# Patient Record
Sex: FEMALE | Race: OTHER
[De-identification: names, ages, dates, MRNs, and addresses within clinical notes are randomized per-mention and may not be internally consistent; named-entity substitution may affect disease eponyms.]

---

## 2018-01-15 ENCOUNTER — HOSPITAL ENCOUNTER (INPATIENT)
Dept: HOSPITAL 54 - TELE | Age: 78
LOS: 3 days | Discharge: HOME | DRG: 140 | End: 2018-01-18
Attending: INTERNAL MEDICINE | Admitting: INTERNAL MEDICINE
Payer: COMMERCIAL

## 2018-01-15 VITALS — SYSTOLIC BLOOD PRESSURE: 130 MMHG | DIASTOLIC BLOOD PRESSURE: 68 MMHG

## 2018-01-15 VITALS — BODY MASS INDEX: 34.42 KG/M2 | WEIGHT: 153 LBS | HEIGHT: 56 IN

## 2018-01-15 VITALS — SYSTOLIC BLOOD PRESSURE: 133 MMHG | DIASTOLIC BLOOD PRESSURE: 72 MMHG

## 2018-01-15 DIAGNOSIS — E66.9: ICD-10-CM

## 2018-01-15 DIAGNOSIS — E86.1: ICD-10-CM

## 2018-01-15 DIAGNOSIS — J15.6: ICD-10-CM

## 2018-01-15 DIAGNOSIS — E87.1: ICD-10-CM

## 2018-01-15 DIAGNOSIS — J44.0: Primary | ICD-10-CM

## 2018-01-15 DIAGNOSIS — N17.0: ICD-10-CM

## 2018-01-15 DIAGNOSIS — Z79.899: ICD-10-CM

## 2018-01-15 DIAGNOSIS — J15.9: ICD-10-CM

## 2018-01-15 DIAGNOSIS — J44.1: ICD-10-CM

## 2018-01-15 DIAGNOSIS — Z79.84: ICD-10-CM

## 2018-01-15 DIAGNOSIS — I10: ICD-10-CM

## 2018-01-15 DIAGNOSIS — E78.5: ICD-10-CM

## 2018-01-15 DIAGNOSIS — E11.65: ICD-10-CM

## 2018-01-15 DIAGNOSIS — J45.909: ICD-10-CM

## 2018-01-15 PROCEDURE — Z7610: HCPCS

## 2018-01-15 PROCEDURE — C9113 INJ PANTOPRAZOLE SODIUM, VIA: HCPCS

## 2018-01-15 NOTE — NUR
RN ADMITTING NOTE

PATIENT BROUGHT INTO THE UNIT VIA GURNEY, ACCOMPANIED BY 3 PERSONNEL OF [facility] 
TRANSPORTATION. PATIENT IS Bengali SPEAKING, ALERT AND ORIENTED X 3, VERBALLY RESPONSIVE AND 
IS ABLE TO MAKE NEEDS KNOWN. NOTED PATIENT RECEIVING O2 VIA NC @2LPM, O2 SAT @ 98%, VITAL 
SIGNS WNL, NOTED WITH SOB UPON EXERTION, IN NO ACUTE DISTRESS ADN WITH NO C/O PAIN AT THIS 
TIME. REPORT RECEIVED THAT PATIENT CAME FROM [facility], AS PER [facility] PERSONNEL, 
PATIENT'S DAUGHTER, CARLOS WILL COME TO THE HOSPITAL IN THE MORNING, CARLOS'S PHONE 
NUMBER IS [phone]. ORIENTED PATIENT TO UNIT, ROOM, CALL LIGHT AND USE OF CALL LIGHT, 
VERBALIZED UNDERSTANDING. ALL PATIENT'S NEEDS ATTENDED TO AT THIS TIME, PT AWARE OF SAFETY 
NEEDS. BED PLACED IN LOW POSITION AND LOCKED IN PLACE.  CALL LIGHT PLACED WITHIN EASY REACH. 
DR. BAM MONTES, ON CALL MD,PAGED FOR ADMISSION ORDERS, AWAITING FOR CALL BACK.

## 2018-01-16 VITALS — SYSTOLIC BLOOD PRESSURE: 123 MMHG | DIASTOLIC BLOOD PRESSURE: 63 MMHG

## 2018-01-16 VITALS — DIASTOLIC BLOOD PRESSURE: 78 MMHG | SYSTOLIC BLOOD PRESSURE: 126 MMHG

## 2018-01-16 VITALS — SYSTOLIC BLOOD PRESSURE: 133 MMHG | DIASTOLIC BLOOD PRESSURE: 78 MMHG

## 2018-01-16 VITALS — SYSTOLIC BLOOD PRESSURE: 129 MMHG | DIASTOLIC BLOOD PRESSURE: 67 MMHG

## 2018-01-16 LAB
BASE EXCESS BLDA CALC-SCNC: 1.5 MMOL/L
DO-HGB MFR BLDA: 66.1 MMHG
INHALED O2 CONCENTRATION: 28 %
IRON SERPL-MCNC: 20 UG/DL (ref 50–175)
PCO2 TEMP ADJ BLDA: 37.4 MMHG (ref 35–45)
PH TEMP ADJ BLDA: 7.45 [PH] (ref 7.35–7.45)
PO2 TEMP ADJ BLDA: 89.4 MMHG (ref 75–100)
SAO2 % BLDA: 96.4 % (ref 92–98.5)
TIBC SERPL-MCNC: 229 UG/DL (ref 250–450)
VENTILATION MODE VENT: (no result)

## 2018-01-16 RX ADMIN — DEXTROSE MONOHYDRATE SCH MLS/HR: 50 INJECTION, SOLUTION INTRAVENOUS at 12:01

## 2018-01-16 RX ADMIN — ALBUTEROL SULFATE SCH MG: 2.5 SOLUTION RESPIRATORY (INHALATION) at 09:14

## 2018-01-16 RX ADMIN — AMLODIPINE BESYLATE SCH MG: 2.5 TABLET ORAL at 02:24

## 2018-01-16 RX ADMIN — PIPERACILLIN SODIUM AND TAZOBACTAM SODIUM SCH MLS/HR: .375; 3 INJECTION, POWDER, LYOPHILIZED, FOR SOLUTION INTRAVENOUS at 10:20

## 2018-01-16 RX ADMIN — INSULIN HUMAN PRN UNIT: 100 INJECTION, SOLUTION PARENTERAL at 17:09

## 2018-01-16 RX ADMIN — SODIUM CHLORIDE SCH MG: 9 INJECTION, SOLUTION INTRAVENOUS at 09:27

## 2018-01-16 RX ADMIN — Medication SCH EACH: at 12:02

## 2018-01-16 RX ADMIN — PIPERACILLIN SODIUM AND TAZOBACTAM SODIUM SCH MLS/HR: .375; 3 INJECTION, POWDER, LYOPHILIZED, FOR SOLUTION INTRAVENOUS at 18:49

## 2018-01-16 RX ADMIN — LOSARTAN POTASSIUM SCH MG: 50 TABLET, FILM COATED ORAL at 09:29

## 2018-01-16 RX ADMIN — SODIUM CHLORIDE PRN MLS/HR: 9 INJECTION, SOLUTION INTRAVENOUS at 21:21

## 2018-01-16 RX ADMIN — PIPERACILLIN SODIUM AND TAZOBACTAM SODIUM SCH MLS/HR: .375; 3 INJECTION, POWDER, LYOPHILIZED, FOR SOLUTION INTRAVENOUS at 13:28

## 2018-01-16 RX ADMIN — INSULIN HUMAN PRN UNIT: 100 INJECTION, SOLUTION PARENTERAL at 12:03

## 2018-01-16 RX ADMIN — INSULIN DETEMIR SCH UNIT: 100 INJECTION, SOLUTION SUBCUTANEOUS at 21:28

## 2018-01-16 RX ADMIN — Medication SCH EACH: at 17:14

## 2018-01-16 RX ADMIN — AMLODIPINE BESYLATE SCH MG: 2.5 TABLET ORAL at 09:29

## 2018-01-16 RX ADMIN — ALBUTEROL SULFATE SCH MG: 2.5 SOLUTION RESPIRATORY (INHALATION) at 19:25

## 2018-01-16 RX ADMIN — ALBUTEROL SULFATE SCH MG: 2.5 SOLUTION RESPIRATORY (INHALATION) at 14:19

## 2018-01-16 RX ADMIN — Medication SCH EACH: at 21:21

## 2018-01-16 RX ADMIN — PIPERACILLIN SODIUM AND TAZOBACTAM SODIUM SCH MLS/HR: .375; 3 INJECTION, POWDER, LYOPHILIZED, FOR SOLUTION INTRAVENOUS at 23:48

## 2018-01-16 RX ADMIN — ALBUTEROL SULFATE SCH MG: 2.5 SOLUTION RESPIRATORY (INHALATION) at 01:54

## 2018-01-16 RX ADMIN — HUMAN INSULIN PRN UNIT: 100 INJECTION, SOLUTION SUBCUTANEOUS at 21:26

## 2018-01-16 NOTE — NUR
RN NOTE

PATIENT'S BLOOD SUGAR = 533MG/DL, STAT RANDOM BLOOD GLUCOSE ORDER PLACED. PATIENT ALERT AND 
ORIENTED X 3 AND IS IN NO ACUTE DISTRESS. ON CALL MD PAGED, AWAITING FOR CALL BACK.

## 2018-01-16 NOTE — NUR
RN OPENING NOTES

PT RESTING IN BED. FAMILY AT BEDSIDE. NO COMPLAINTS OF PAIN OR DISTRESS AT THIS TIME. PT HAS 
A LEFT HAND #22 IV, INTACT AND PATENT. SAFETY PRECAUTIONS IN PLACE, BED IN LOW LOCKED 
POSITION, [id number] UP CALL LIGHT WITHIN REACH. WILL CONTINUE TO MONITOR.

## 2018-01-16 NOTE — NUR
Spoke with daughter Naial, patient lives with daughter Naila and Emilie in the second floor 
apartment. Patient speaks Welsh only; she ambulate with a cane, semi-independent with 
adl's. Has no homehealth reported. Family are involved and supportive, daughter will provide 
ride home once discharge. 

-------------------------------------------------------------------------------

Addendum: 01/16/18 at 1503 by SORAYA DA SILVA RN

-------------------------------------------------------------------------------

Amended: Links added.

## 2018-01-16 NOTE — NUR
RN CLOSING NOTES

PATIENT IN BED, ASLEEP BUT CAN BE EASILY AWOKEN, ALERT AND ORIENTED X 4, VERBALLY 
RESPONSIVE, NO C/O PAIN AT THIS TIME, IN NO ACUTE DISTRESS. CONTINUES TO RECEIVE O2 VIA NC @ 
2LPM, O2 SAT @ 95% ALL PATIENT'S NEEDS ATTENDED TO. DVT PUMPS IN PLACE, CALL LIGHT PLACED 
WITHIN EASY REACH. WILL ENDORSE TO AM SHIFT NURSE FOR CONTINUITY OF CARE.

## 2018-01-16 NOTE — NUR
MS RN NOTES 



PATIENTS BLOOD SUGAR NOTED 447MG/DL INSULIN ADMINISTERED AS PER SLIDING SCALE. ART DIEHL 
NOTIFIED OF GLUCOSE LEVEL STATES WILL COME TO EVALUATE PATIENT.

## 2018-01-16 NOTE — NUR
MS RN NOTES 



ART GALLEGOS NP REVIEWED PATIENTS LABS ORDERED LEVAMIR 10 UNITS QHS. ORDERS NOTED AND CARRIED 
OUT.

## 2018-01-16 NOTE — NUR
MS RN NOTES 



PATIENT IN BED RESTING NO SOB OR ACUTE DISTRESS NOTED. PATIENT ALERT, ORIENTED X4 American 
SPEAKING. PERIPHERAL IV INTACT PATENT. BED IN LOW LOCKED POSITION. CALL LIGHT WITHIN REACH. 
PATIENT NOTED WITH COUGH. WITH NORMAL NONE LABORED BREATHING. ON 2L OXYGEN VIA NASAL 
CANNULA.  WILL CONTINUE TO MONITOR.

## 2018-01-16 NOTE — NUR
TELE RN NOTES 



PATIENT NOTED WITH BG OF 483MG/DL PATIENT ASYMPTOMATIC ART DIEHL NOTIFIED ORDERS TO CHANGE 
SLIDING SCALE FROM MILD TO MOD. PATIENT ALSO SEEN AND EVALUATED BY ART DIEHL ORDERS NOTED 
AND CARRIED OUT.

## 2018-01-16 NOTE — NUR
RN NOTE

PATIENT IN BED, ASLEEP BUT EASILY AWOKEN, ALERT AND ORIENTED X 4, IN NO ACUTE DISTRESS, 
CONTINUES TO RECEIVE  O2 VIA NC @ 2LPM. CALL LIGHT PLACED WITHIN EASY REACH. WILL CONTINUE 
TO MONITOR PATIENT.

## 2018-01-16 NOTE — NUR
MS RN NOTES 



PATIENT IN BED ALERT, ORIENTED X4 FAMILY AT BEDSIDE. NO SOB OR ACUTE DISTRESS NOTED. ALL DUE 
MEDICATIONS ADMINISTERED. ALL NEEDS MET. PERIPHERAL IV ON LEFT HAND INTACT PATENT. BED IN 
LOW LOCKED POSITION. CALL LIGHT WITHIN REACH. WILL ENDORSE JUSTIN TO PM SHIFT.

## 2018-01-17 VITALS — DIASTOLIC BLOOD PRESSURE: 81 MMHG | SYSTOLIC BLOOD PRESSURE: 138 MMHG

## 2018-01-17 VITALS — SYSTOLIC BLOOD PRESSURE: 115 MMHG | DIASTOLIC BLOOD PRESSURE: 81 MMHG

## 2018-01-17 VITALS — DIASTOLIC BLOOD PRESSURE: 81 MMHG | SYSTOLIC BLOOD PRESSURE: 145 MMHG

## 2018-01-17 LAB
ALBUMIN SERPL BCP-MCNC: 2.3 G/DL (ref 3.4–5)
ALP SERPL-CCNC: 62 U/L (ref 46–116)
ALT SERPL W P-5'-P-CCNC: 29 U/L (ref 12–78)
APTT PPP: 23 SEC (ref 23–34)
AST SERPL W P-5'-P-CCNC: 17 U/L (ref 15–37)
BASOPHILS # BLD AUTO: 0 /CMM (ref 0–0.2)
BASOPHILS NFR BLD AUTO: 0 % (ref 0–2)
BILIRUB SERPL-MCNC: 0.3 MG/DL (ref 0.2–1)
BUN SERPL-MCNC: 24 MG/DL (ref 7–18)
CALCIUM SERPL-MCNC: 8.6 MG/DL (ref 8.5–10.1)
CHLORIDE SERPL-SCNC: 105 MMOL/L (ref 98–107)
CK MB SERPL-MCNC: 0.6 NG/ML (ref 0–3.6)
CO2 SERPL-SCNC: 25 MMOL/L (ref 21–32)
CREAT SERPL-MCNC: 0.8 MG/DL (ref 0.6–1.3)
EOSINOPHIL # BLD AUTO: 0 /CMM (ref 0–0.7)
EOSINOPHIL NFR BLD AUTO: 0 % (ref 0–6)
FIBRINOGEN PPP-MCNC: 522 MG/DL (ref 213–485)
GLUCOSE SERPL-MCNC: 305 MG/DL (ref 74–106)
HCT VFR BLD AUTO: 31 % (ref 33–45)
HGB BLD-MCNC: 10.8 G/DL (ref 11.5–14.8)
INR PPP: 1 (ref 0.87–1.13)
LYMPHOCYTES NFR BLD AUTO: 0.8 /CMM (ref 0.8–4.8)
LYMPHOCYTES NFR BLD AUTO: 8.2 % (ref 20–44)
MCH RBC QN AUTO: 30 PG (ref 26–33)
MCHC RBC AUTO-ENTMCNC: 35 G/DL (ref 31–36)
MCV RBC AUTO: 87 FL (ref 82–100)
MONOCYTES NFR BLD AUTO: 0.3 /CMM (ref 0.1–1.3)
MONOCYTES NFR BLD AUTO: 3.5 % (ref 2–12)
NEUTROPHILS # BLD AUTO: 8.4 /CMM (ref 1.8–8.9)
NEUTROPHILS NFR BLD AUTO: 88.3 % (ref 43–81)
PLATELET # BLD AUTO: 167 /CMM (ref 150–450)
POTASSIUM SERPL-SCNC: 3.7 MMOL/L (ref 3.5–5.1)
PROT SERPL-MCNC: 7 G/DL (ref 6.4–8.2)
RBC # BLD AUTO: 3.61 MIL/UL (ref 4–5.2)
RDW COEFFICIENT OF VARIATION: 16.5 (ref 11.5–15)
SODIUM SERPL-SCNC: 141 MMOL/L (ref 136–145)
TROPONIN I SERPL-MCNC: < 0.017 NG/ML (ref 0–0.06)
WBC NRBC COR # BLD AUTO: 9.5 K/UL (ref 4.3–11)

## 2018-01-17 RX ADMIN — INSULIN DETEMIR SCH UNIT: 100 INJECTION, SOLUTION SUBCUTANEOUS at 22:02

## 2018-01-17 RX ADMIN — ALBUTEROL SULFATE SCH MG: 2.5 SOLUTION RESPIRATORY (INHALATION) at 02:20

## 2018-01-17 RX ADMIN — INSULIN HUMAN PRN UNIT: 100 INJECTION, SOLUTION PARENTERAL at 17:10

## 2018-01-17 RX ADMIN — LOSARTAN POTASSIUM SCH MG: 50 TABLET, FILM COATED ORAL at 08:24

## 2018-01-17 RX ADMIN — PIPERACILLIN SODIUM AND TAZOBACTAM SODIUM SCH MLS/HR: .375; 3 INJECTION, POWDER, LYOPHILIZED, FOR SOLUTION INTRAVENOUS at 17:51

## 2018-01-17 RX ADMIN — SODIUM CHLORIDE SCH MG: 9 INJECTION, SOLUTION INTRAVENOUS at 08:23

## 2018-01-17 RX ADMIN — HUMAN INSULIN PRN UNIT: 100 INJECTION, SOLUTION SUBCUTANEOUS at 12:11

## 2018-01-17 RX ADMIN — DEXTROSE MONOHYDRATE SCH MLS/HR: 50 INJECTION, SOLUTION INTRAVENOUS at 00:57

## 2018-01-17 RX ADMIN — Medication SCH EACH: at 17:11

## 2018-01-17 RX ADMIN — PIPERACILLIN SODIUM AND TAZOBACTAM SODIUM SCH MLS/HR: .375; 3 INJECTION, POWDER, LYOPHILIZED, FOR SOLUTION INTRAVENOUS at 12:19

## 2018-01-17 RX ADMIN — Medication SCH EACH: at 21:57

## 2018-01-17 RX ADMIN — ALBUTEROL SULFATE SCH MG: 2.5 SOLUTION RESPIRATORY (INHALATION) at 19:28

## 2018-01-17 RX ADMIN — Medication SCH EACH: at 06:15

## 2018-01-17 RX ADMIN — HUMAN INSULIN PRN UNIT: 100 INJECTION, SOLUTION SUBCUTANEOUS at 22:03

## 2018-01-17 RX ADMIN — ALBUTEROL SULFATE SCH MG: 2.5 SOLUTION RESPIRATORY (INHALATION) at 15:19

## 2018-01-17 RX ADMIN — ALBUTEROL SULFATE SCH MG: 2.5 SOLUTION RESPIRATORY (INHALATION) at 10:50

## 2018-01-17 RX ADMIN — AMLODIPINE BESYLATE SCH MG: 2.5 TABLET ORAL at 08:25

## 2018-01-17 RX ADMIN — PIPERACILLIN SODIUM AND TAZOBACTAM SODIUM SCH MLS/HR: .375; 3 INJECTION, POWDER, LYOPHILIZED, FOR SOLUTION INTRAVENOUS at 06:15

## 2018-01-17 RX ADMIN — Medication SCH EACH: at 12:19

## 2018-01-17 RX ADMIN — INSULIN HUMAN PRN UNIT: 100 INJECTION, SOLUTION PARENTERAL at 06:29

## 2018-01-17 RX ADMIN — DEXTROSE MONOHYDRATE SCH MLS/HR: 50 INJECTION, SOLUTION INTRAVENOUS at 13:12

## 2018-01-17 NOTE — NUR
RN CLOSING NOTES.

PT REMAINS A&0X3 RESTING IN BED WITH FAMILY AT BEDSIDE, PT Turkmen SPEAKING. PT TOLERATING 
ROOM AIR WITHOUT SOB AND SAO2 WNL . PT DENIES PAIN. P WITH IVC AT RIGHT AC INTACT AND 
OPERATIONAL. BED IN LOWEST LOCKED POSITION WITH HANDRAILSX2 AND CALL BELL WITHIN REACH. ALL 
DAY NURSE DUTIES ATTENDED TO, PT IS WITHOUT CONCERN OR COMPLAINT AT THIS TIME. WILL ENDORSE 
TO NIGHT NURSE.

## 2018-01-17 NOTE — NUR
RN OPENING NOTES.

PT RECEIVED A&0X3 RESTING IN BED, PT Cambodian SPEAKING. PT TOLERATING ROOM AIR WITHOUT SOB 
AND SAO2 WNL AT 96%. PT REPORTING MINOR GENERALIZED PAIN AND TYLENOL PRN ADMIN. BED IN 
LOWEST LOCKED POSITION WITH HANDRAILSX2 AND CALL BELL WITHIN REACH. PT BRIEFED ON TODAY'S 
POC AND IS WITHOUT CONCERN OR COMPLAINT AT THIS TIME.

## 2018-01-17 NOTE — NUR
RN CLOSING NOTES

PT RESTING IN BED. NO COMPLAINTS OF PAIN OR DISTRESS AT THIS TIME. PT HAS A LEFT HAND #22 IV 
RUNNING NS @75ML/HR, INTACT AND PATENT. SAFETY PRECAUTIONS IN PLACE, BED IN LOW LOCKED 
POSITION, [id number] UP CALL LIGHT WITHIN REACH. ALL PATIENT NEEDS MET. WILL ENDORSE TO DAY 
SHIFT NURSE FOR CONTINUITY OF CARE.

## 2018-01-17 NOTE — NUR
MS RN NOTES

RECEIVED PT IN BED, AWAKE, A/O X 3 Macedonian SPEAKING, WATCHING TV AT THIS TIME. DAUGHTER AND 
 AT BEDSIDE. NO DISTRESS, NO SOB NOTED. ON 02 @ 2LPM VIA NC GLORIA WELL. IV SITE ON RAC 
INTACT AND PATENT, NO S/S OF INFILTRATION NOTED. NO S/S OF HYPO/ HYPERGLYCEMIA NOTED. DENIES 
ANY PAIN OR DISCOMFORT AT AT THIS TIME. CALL LIGHT WITHIN REACH. WILL CONT TO MONITOR.

## 2018-01-18 VITALS — DIASTOLIC BLOOD PRESSURE: 61 MMHG | SYSTOLIC BLOOD PRESSURE: 130 MMHG

## 2018-01-18 VITALS — SYSTOLIC BLOOD PRESSURE: 130 MMHG | DIASTOLIC BLOOD PRESSURE: 61 MMHG

## 2018-01-18 LAB
BUN SERPL-MCNC: 26 MG/DL (ref 7–18)
CALCIUM SERPL-MCNC: 8.7 MG/DL (ref 8.5–10.1)
CHLORIDE SERPL-SCNC: 103 MMOL/L (ref 98–107)
CO2 SERPL-SCNC: 26 MMOL/L (ref 21–32)
CREAT SERPL-MCNC: 0.9 MG/DL (ref 0.6–1.3)
GLUCOSE SERPL-MCNC: 401 MG/DL (ref 74–106)
POTASSIUM SERPL-SCNC: 3.9 MMOL/L (ref 3.5–5.1)
SODIUM SERPL-SCNC: 138 MMOL/L (ref 136–145)

## 2018-01-18 RX ADMIN — INSULIN HUMAN PRN UNIT: 100 INJECTION, SOLUTION PARENTERAL at 06:01

## 2018-01-18 RX ADMIN — PIPERACILLIN SODIUM AND TAZOBACTAM SODIUM SCH MLS/HR: .375; 3 INJECTION, POWDER, LYOPHILIZED, FOR SOLUTION INTRAVENOUS at 00:21

## 2018-01-18 RX ADMIN — PIPERACILLIN SODIUM AND TAZOBACTAM SODIUM SCH MLS/HR: .375; 3 INJECTION, POWDER, LYOPHILIZED, FOR SOLUTION INTRAVENOUS at 06:06

## 2018-01-18 RX ADMIN — Medication SCH EACH: at 05:57

## 2018-01-18 RX ADMIN — AMLODIPINE BESYLATE SCH MG: 2.5 TABLET ORAL at 09:10

## 2018-01-18 RX ADMIN — SODIUM CHLORIDE PRN MLS/HR: 9 INJECTION, SOLUTION INTRAVENOUS at 04:47

## 2018-01-18 RX ADMIN — ALBUTEROL SULFATE SCH MG: 2.5 SOLUTION RESPIRATORY (INHALATION) at 01:39

## 2018-01-18 RX ADMIN — ALBUTEROL SULFATE SCH MG: 2.5 SOLUTION RESPIRATORY (INHALATION) at 07:19

## 2018-01-18 RX ADMIN — LOSARTAN POTASSIUM SCH MG: 50 TABLET, FILM COATED ORAL at 09:10

## 2018-01-18 RX ADMIN — SODIUM CHLORIDE SCH MG: 9 INJECTION, SOLUTION INTRAVENOUS at 09:08

## 2018-01-18 NOTE — NUR
RN DISCHARGE



PT. LEFT IN MEDICALLY STABLE CONDITION WITH FAMILY ALONG SIDE WITH DISCHARGE PACKET, FWW, 
AND PRESCRIPTION.

## 2018-01-18 NOTE — NUR
MS RN NOTES

PT IN BED, RESTING COMFORTABLY AT THIS TIME, AROUSES EASILY.  A/O X 3 French SPEAKING. NO 
DISTRESS, NO SOB NOTED. ON 02 @ 2LPM VIA NC GLORIA WELL. IV SITE ON RAC INTACT AND PATENT, NO 
S/S OF INFILTRATION NOTED. NO S/S OF HYPO/ HYPERGLYCEMIA NOTED. BS : 368 AT THIS TIME, WITH 
INSULIN SS GIVEN AS ORDERED. SNACK PROVIDED TO PT.  DENIES ANY PAIN OR DISCOMFORT AT AT THIS 
TIME. ALL NEEDS ATTENDED AND MET. SAFETY PRECAUTIONS OBSERVED. CALL LIGHT WITHIN REACH.

## 2018-01-18 NOTE — NUR
RN NOTES DISCHARGE



PT.'S FAMILY AT BESIDE. PT. AND PT.'S DAUGHTER WAS PROVIDE DISCHARGE INSTRUCTIONS, WITH 
EDUCATION, AND PT. VERBALIZED UNDERSTANDING. NEW MEDICATIONS EDUCATION GIVEN, AND 
UNDERSTANDING WAS VERBALIZED. ID BAND, AND IV WAS REMOVED WITHOUT COMPLICATIONS. FWW WAS 
BROUGHT TO PT.'S ROOM. ALL QUESTIONS ANSWERED.

## 2018-01-18 NOTE — NUR
RN OPENING NOTES



RECEIVED PT. IN BED A&OX3. PT. IS RECEIVING A BREATHING TREATMENT. BREATHING UNLABORED, AND 
EVENLY ON OXYGEN. NO S/S OF ACUTE DISTRESS. IV ACCESS ON RIGHT ANTECUBITAL SITE INTACT, IV 
FLUIDS RUNNING AT 75 ML/HR. WALKER AT BEDSIDE FOR ASSISTANCE ON AMBULATION. BED IS IN 
LOWEST, AND LOCKED POSITION, 2 SIDE RAILS UP, AND INSTRUCTED PT. TO USE CALL LIGHT FOR 
ASSISTANCE. ALL NEEDS MET. WILL CONTINUE TO ASSESS AND MONITOR.

## 2018-01-18 NOTE — NUR
RN NOTES



PT.'S PHARMACY CALLED TO CLARIFY PRESCRIPTION ORDERS. LET THE PHARMACIST KNOW THAT I WILL 
CONTACT THE NP TO CALL BACK TO CLARIFY ORDERS AND WROTE PHARMACY'S TELEPHONE NUMBER. 
CONTACTED THE NP TO NOTIFY THE PHARMACY'S.